# Patient Record
Sex: MALE | Race: WHITE | NOT HISPANIC OR LATINO | Employment: FULL TIME | URBAN - METROPOLITAN AREA
[De-identification: names, ages, dates, MRNs, and addresses within clinical notes are randomized per-mention and may not be internally consistent; named-entity substitution may affect disease eponyms.]

---

## 2023-02-28 LAB
ALANINE AMINOTRANSFERASE (SGPT) (U/L) IN SER/PLAS: 19 U/L (ref 10–52)
ALBUMIN (G/DL) IN SER/PLAS: 3.8 G/DL (ref 3.4–5)
ALBUMIN (MG/L) IN URINE: <7 MG/L
ALBUMIN/CREATININE (UG/MG) IN URINE: NORMAL UG/MG CRT (ref 0–30)
ALKALINE PHOSPHATASE (U/L) IN SER/PLAS: 69 U/L (ref 33–120)
ANION GAP IN SER/PLAS: 14 MMOL/L (ref 10–20)
ASPARTATE AMINOTRANSFERASE (SGOT) (U/L) IN SER/PLAS: 12 U/L (ref 9–39)
BILIRUBIN TOTAL (MG/DL) IN SER/PLAS: 0.7 MG/DL (ref 0–1.2)
CALCIDIOL (25 OH VITAMIN D3) (NG/ML) IN SER/PLAS: 14 NG/ML
CALCIUM (MG/DL) IN SER/PLAS: 8.4 MG/DL (ref 8.6–10.3)
CARBON DIOXIDE, TOTAL (MMOL/L) IN SER/PLAS: 30 MMOL/L (ref 21–32)
CHLORIDE (MMOL/L) IN SER/PLAS: 97 MMOL/L (ref 98–107)
CHOLESTEROL (MG/DL) IN SER/PLAS: 118 MG/DL (ref 0–199)
CHOLESTEROL IN HDL (MG/DL) IN SER/PLAS: 40.9 MG/DL
CHOLESTEROL/HDL RATIO: 2.9
CREATININE (MG/DL) IN SER/PLAS: 0.94 MG/DL (ref 0.5–1.3)
CREATININE (MG/DL) IN URINE: 71.4 MG/DL (ref 20–370)
ERYTHROCYTE DISTRIBUTION WIDTH (RATIO) BY AUTOMATED COUNT: 13.8 % (ref 11.5–14.5)
ERYTHROCYTE MEAN CORPUSCULAR HEMOGLOBIN CONCENTRATION (G/DL) BY AUTOMATED: 32.5 G/DL (ref 32–36)
ERYTHROCYTE MEAN CORPUSCULAR VOLUME (FL) BY AUTOMATED COUNT: 87 FL (ref 80–100)
ERYTHROCYTES (10*6/UL) IN BLOOD BY AUTOMATED COUNT: 4.91 X10E12/L (ref 4.5–5.9)
ESTIMATED AVERAGE GLUCOSE FOR HBA1C: 146 MG/DL
GFR MALE: >90 ML/MIN/1.73M2
GLUCOSE (MG/DL) IN SER/PLAS: 125 MG/DL (ref 74–99)
HEMATOCRIT (%) IN BLOOD BY AUTOMATED COUNT: 42.8 % (ref 41–52)
HEMOGLOBIN (G/DL) IN BLOOD: 13.9 G/DL (ref 13.5–17.5)
HEMOGLOBIN A1C/HEMOGLOBIN TOTAL IN BLOOD: 6.7 %
LDL: 26 MG/DL (ref 0–99)
LEUKOCYTES (10*3/UL) IN BLOOD BY AUTOMATED COUNT: 6.7 X10E9/L (ref 4.4–11.3)
NON HDL CHOLESTEROL: 77 MG/DL
PLATELETS (10*3/UL) IN BLOOD AUTOMATED COUNT: 187 X10E9/L (ref 150–450)
POTASSIUM (MMOL/L) IN SER/PLAS: 4 MMOL/L (ref 3.5–5.3)
PROTEIN TOTAL: 6.4 G/DL (ref 6.4–8.2)
SODIUM (MMOL/L) IN SER/PLAS: 137 MMOL/L (ref 136–145)
THYROTROPIN (MIU/L) IN SER/PLAS BY DETECTION LIMIT <= 0.05 MIU/L: 2.45 MIU/L (ref 0.44–3.98)
TRIGLYCERIDE (MG/DL) IN SER/PLAS: 258 MG/DL (ref 0–149)
UREA NITROGEN (MG/DL) IN SER/PLAS: 13 MG/DL (ref 6–23)
VLDL: 52 MG/DL (ref 0–40)

## 2023-09-07 LAB
ANION GAP IN SER/PLAS: 12 MMOL/L (ref 10–20)
CALCIDIOL (25 OH VITAMIN D3) (NG/ML) IN SER/PLAS: 16 NG/ML
CALCIUM (MG/DL) IN SER/PLAS: 8.8 MG/DL (ref 8.6–10.3)
CARBON DIOXIDE, TOTAL (MMOL/L) IN SER/PLAS: 26 MMOL/L (ref 21–32)
CHLORIDE (MMOL/L) IN SER/PLAS: 106 MMOL/L (ref 98–107)
CREATININE (MG/DL) IN SER/PLAS: 0.9 MG/DL (ref 0.5–1.3)
ESTIMATED AVERAGE GLUCOSE FOR HBA1C: 131 MG/DL
GFR MALE: >90 ML/MIN/1.73M2
GLUCOSE (MG/DL) IN SER/PLAS: 150 MG/DL (ref 74–99)
HEMOGLOBIN A1C/HEMOGLOBIN TOTAL IN BLOOD: 6.2 %
POTASSIUM (MMOL/L) IN SER/PLAS: 4.2 MMOL/L (ref 3.5–5.3)
SODIUM (MMOL/L) IN SER/PLAS: 140 MMOL/L (ref 136–145)
UREA NITROGEN (MG/DL) IN SER/PLAS: 15 MG/DL (ref 6–23)

## 2024-03-06 ENCOUNTER — LAB (OUTPATIENT)
Dept: LAB | Facility: LAB | Age: 54
End: 2024-03-06
Payer: COMMERCIAL

## 2024-03-06 DIAGNOSIS — E11.9 TYPE 2 DIABETES MELLITUS WITHOUT COMPLICATIONS (MULTI): Primary | ICD-10-CM

## 2024-03-06 LAB
25(OH)D3 SERPL-MCNC: 77 NG/ML (ref 30–100)
ALBUMIN SERPL BCP-MCNC: 4.2 G/DL (ref 3.4–5)
ALP SERPL-CCNC: 78 U/L (ref 33–120)
ALT SERPL W P-5'-P-CCNC: 18 U/L (ref 10–52)
ANION GAP SERPL CALC-SCNC: 15 MMOL/L (ref 10–20)
AST SERPL W P-5'-P-CCNC: 12 U/L (ref 9–39)
BILIRUB SERPL-MCNC: 0.9 MG/DL (ref 0–1.2)
BUN SERPL-MCNC: 21 MG/DL (ref 6–23)
CALCIUM SERPL-MCNC: 9.5 MG/DL (ref 8.6–10.6)
CHLORIDE SERPL-SCNC: 102 MMOL/L (ref 98–107)
CHOLEST SERPL-MCNC: 181 MG/DL (ref 0–199)
CHOLESTEROL/HDL RATIO: 4.6
CO2 SERPL-SCNC: 28 MMOL/L (ref 21–32)
CREAT SERPL-MCNC: 0.93 MG/DL (ref 0.5–1.3)
CREAT UR-MCNC: 117.4 MG/DL (ref 20–370)
EGFRCR SERPLBLD CKD-EPI 2021: >90 ML/MIN/1.73M*2
ERYTHROCYTE [DISTWIDTH] IN BLOOD BY AUTOMATED COUNT: 13.4 % (ref 11.5–14.5)
EST. AVERAGE GLUCOSE BLD GHB EST-MCNC: 120 MG/DL
GLUCOSE SERPL-MCNC: 101 MG/DL (ref 74–99)
HBA1C MFR BLD: 5.8 %
HCT VFR BLD AUTO: 44.4 % (ref 41–52)
HDLC SERPL-MCNC: 39.1 MG/DL
HGB BLD-MCNC: 14.9 G/DL (ref 13.5–17.5)
LDLC SERPL CALC-MCNC: 76 MG/DL
MCH RBC QN AUTO: 29.5 PG (ref 26–34)
MCHC RBC AUTO-ENTMCNC: 33.6 G/DL (ref 32–36)
MCV RBC AUTO: 88 FL (ref 80–100)
MICROALBUMIN UR-MCNC: <7 MG/L
MICROALBUMIN/CREAT UR: NORMAL MG/G{CREAT}
NON HDL CHOLESTEROL: 142 MG/DL (ref 0–149)
NRBC BLD-RTO: 0 /100 WBCS (ref 0–0)
PLATELET # BLD AUTO: 225 X10*3/UL (ref 150–450)
POTASSIUM SERPL-SCNC: 4.1 MMOL/L (ref 3.5–5.3)
PROT SERPL-MCNC: 6.9 G/DL (ref 6.4–8.2)
RBC # BLD AUTO: 5.05 X10*6/UL (ref 4.5–5.9)
SODIUM SERPL-SCNC: 141 MMOL/L (ref 136–145)
TRIGL SERPL-MCNC: 331 MG/DL (ref 0–149)
TSH SERPL-ACNC: 2.18 MIU/L (ref 0.44–3.98)
VLDL: 66 MG/DL (ref 0–40)
WBC # BLD AUTO: 7.2 X10*3/UL (ref 4.4–11.3)

## 2024-03-06 PROCEDURE — 84443 ASSAY THYROID STIM HORMONE: CPT

## 2024-03-06 PROCEDURE — 80061 LIPID PANEL: CPT

## 2024-03-06 PROCEDURE — 85027 COMPLETE CBC AUTOMATED: CPT

## 2024-03-06 PROCEDURE — 80053 COMPREHEN METABOLIC PANEL: CPT

## 2024-03-06 PROCEDURE — 36415 COLL VENOUS BLD VENIPUNCTURE: CPT

## 2024-03-06 PROCEDURE — 83036 HEMOGLOBIN GLYCOSYLATED A1C: CPT

## 2024-03-06 PROCEDURE — 82570 ASSAY OF URINE CREATININE: CPT

## 2024-03-06 PROCEDURE — 82306 VITAMIN D 25 HYDROXY: CPT

## 2024-03-06 PROCEDURE — 82043 UR ALBUMIN QUANTITATIVE: CPT

## 2024-03-07 ENCOUNTER — OFFICE VISIT (OUTPATIENT)
Dept: ENDOCRINOLOGY | Facility: CLINIC | Age: 54
End: 2024-03-07
Payer: COMMERCIAL

## 2024-03-07 VITALS
HEART RATE: 86 BPM | BODY MASS INDEX: 37.7 KG/M2 | SYSTOLIC BLOOD PRESSURE: 138 MMHG | WEIGHT: 278 LBS | DIASTOLIC BLOOD PRESSURE: 88 MMHG

## 2024-03-07 DIAGNOSIS — E11.9 TYPE 2 DIABETES MELLITUS WITHOUT COMPLICATION, WITHOUT LONG-TERM CURRENT USE OF INSULIN (MULTI): Primary | ICD-10-CM

## 2024-03-07 DIAGNOSIS — E78.5 HYPERLIPIDEMIA, UNSPECIFIED HYPERLIPIDEMIA TYPE: ICD-10-CM

## 2024-03-07 DIAGNOSIS — E55.9 VITAMIN D DEFICIENCY: ICD-10-CM

## 2024-03-07 DIAGNOSIS — Z98.84 BARIATRIC SURGERY STATUS: ICD-10-CM

## 2024-03-07 PROCEDURE — 3044F HG A1C LEVEL LT 7.0%: CPT | Performed by: INTERNAL MEDICINE

## 2024-03-07 PROCEDURE — 3048F LDL-C <100 MG/DL: CPT | Performed by: INTERNAL MEDICINE

## 2024-03-07 PROCEDURE — 3062F POS MACROALBUMINURIA REV: CPT | Performed by: INTERNAL MEDICINE

## 2024-03-07 PROCEDURE — 3075F SYST BP GE 130 - 139MM HG: CPT | Performed by: INTERNAL MEDICINE

## 2024-03-07 PROCEDURE — 99214 OFFICE O/P EST MOD 30 MIN: CPT | Performed by: INTERNAL MEDICINE

## 2024-03-07 PROCEDURE — 1036F TOBACCO NON-USER: CPT | Performed by: INTERNAL MEDICINE

## 2024-03-07 PROCEDURE — 3079F DIAST BP 80-89 MM HG: CPT | Performed by: INTERNAL MEDICINE

## 2024-03-07 RX ORDER — PROPRANOLOL HYDROCHLORIDE 10 MG/1
TABLET ORAL
COMMUNITY
Start: 2023-08-24

## 2024-03-07 RX ORDER — TRAZODONE HYDROCHLORIDE 50 MG/1
50 TABLET ORAL NIGHTLY
COMMUNITY

## 2024-03-07 RX ORDER — BLOOD-GLUCOSE SENSOR
EACH MISCELLANEOUS
Qty: 6 EACH | Refills: 2 | Status: SHIPPED | OUTPATIENT
Start: 2024-03-07

## 2024-03-07 RX ORDER — ATORVASTATIN CALCIUM 40 MG/1
40 TABLET, FILM COATED ORAL NIGHTLY
COMMUNITY
End: 2024-03-07 | Stop reason: SDUPTHER

## 2024-03-07 RX ORDER — ERGOCALCIFEROL 1.25 MG/1
1.25 CAPSULE ORAL
Qty: 12 CAPSULE | Refills: 3 | Status: SHIPPED | OUTPATIENT
Start: 2024-03-07 | End: 2025-03-07

## 2024-03-07 RX ORDER — BUPROPION HYDROCHLORIDE 150 MG/1
TABLET ORAL
COMMUNITY
Start: 2024-01-17

## 2024-03-07 RX ORDER — ATORVASTATIN CALCIUM 40 MG/1
40 TABLET, FILM COATED ORAL NIGHTLY
Qty: 90 TABLET | Refills: 3 | Status: SHIPPED | OUTPATIENT
Start: 2024-03-07 | End: 2025-03-07

## 2024-03-07 RX ORDER — METFORMIN HYDROCHLORIDE 1000 MG/1
1000 TABLET ORAL 2 TIMES DAILY
COMMUNITY
End: 2024-03-07 | Stop reason: SDUPTHER

## 2024-03-07 RX ORDER — BUPROPION HYDROCHLORIDE 300 MG/1
300 TABLET ORAL DAILY
COMMUNITY

## 2024-03-07 RX ORDER — CALCIUM CARBONATE/VITAMIN D3 500 MG-2.5
TABLET,CHEWABLE ORAL
COMMUNITY
Start: 2017-11-27

## 2024-03-07 RX ORDER — ESCITALOPRAM OXALATE 20 MG/1
40 TABLET ORAL DAILY
COMMUNITY

## 2024-03-07 RX ORDER — CALCIUM CARB/VITAMIN D3/VIT K1 500-500-40
TABLET,CHEWABLE ORAL
COMMUNITY
Start: 2017-11-27

## 2024-03-07 RX ORDER — BLOOD-GLUCOSE SENSOR
EACH MISCELLANEOUS
COMMUNITY
Start: 2023-12-31 | End: 2024-03-07 | Stop reason: SDUPTHER

## 2024-03-07 RX ORDER — METFORMIN HYDROCHLORIDE 1000 MG/1
1000 TABLET ORAL 2 TIMES DAILY
Qty: 180 TABLET | Refills: 3 | Status: SHIPPED | OUTPATIENT
Start: 2024-03-07 | End: 2025-03-07

## 2024-03-07 RX ORDER — MAGNESIUM 200 MG
TABLET ORAL
COMMUNITY
Start: 2017-11-27

## 2024-03-07 RX ORDER — ERGOCALCIFEROL 1.25 MG/1
1.25 CAPSULE ORAL
COMMUNITY
End: 2024-03-07 | Stop reason: SDUPTHER

## 2024-03-07 RX ORDER — SERTRALINE HYDROCHLORIDE 100 MG/1
100 TABLET, FILM COATED ORAL DAILY
COMMUNITY
Start: 2024-01-17

## 2024-03-07 RX ORDER — DEXTROAMPHETAMINE SACCHARATE, AMPHETAMINE ASPARTATE MONOHYDRATE, DEXTROAMPHETAMINE SULFATE AND AMPHETAMINE SULFATE 5; 5; 5; 5 MG/1; MG/1; MG/1; MG/1
20 CAPSULE, EXTENDED RELEASE ORAL
COMMUNITY

## 2024-03-07 ASSESSMENT — ENCOUNTER SYMPTOMS
VOMITING: 0
NAUSEA: 0
DIARRHEA: 0
UNEXPECTED WEIGHT CHANGE: 1
ENDOCRINE COMMENTS: AS ABOVE

## 2024-03-07 NOTE — PROGRESS NOTES
History Of Present Illness  Denis Mccoy is a 54 y.o. male     Duration of type 2 diabetes mellitus:  12 years  Complications:  peripheral neuropathy    Bariatric surgery status  RNYGB (11/9/17)    Weight loss >20 lbs in 6 months    Patient is testing glucose   Records not provided   FreeStyle Kemar 3 off for 1 month    Last eye exam:  2023    Vitamin D deficiency  Weekly ergocalciferol 53014 units      Hyperlipidemia    Past Medical History  He has a past medical history of Essential (primary) hypertension (09/29/2022), Hyperlipidemia, unspecified (09/29/2022), Obstructive sleep apnea (adult) (pediatric) (11/06/2019), Other acute postprocedural pain, Personal history of urinary calculi (07/16/2014), Postsurgical malabsorption, not elsewhere classified (11/07/2018), 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    Surgical History  He has a past surgical history that includes Other surgical history (07/16/2014); Gastric bypass (11/27/2017); and Adenoidectomy (05/02/2017).     Social History  He reports that he has never smoked. He has never used smokeless tobacco. No history on file for alcohol use and drug use.    Family History  No family history on file.    Medications  Current Outpatient Medications   Medication Instructions   • amphetamine-dextroamphetamine XR (Adderall XR) 20 mg 24 hr capsule 20 mg, oral, Daily before breakfast   • atorvastatin (LIPITOR) 40 mg, oral, Nightly   • buPROPion XL (Wellbutrin XL) 150 mg 24 hr tablet TAKE 1 TABLET BY MOUTH EVERY AM WITH 300MG FOR A TOTAL OF 450MG EVERY AM   • buPROPion XL (WELLBUTRIN XL) 300 mg, oral, Daily   • calcium carbonate-vitamin D3 500 mg-2.5 mcg (100 unit) tablet,chewable oral   • cyanocobalamin, vitamin B-12, 1,000 mcg tablet, sublingual sublingual   • escitalopram (LEXAPRO) 40 mg, oral, Daily   • FreeStyle Kemar 3 Sensor device use as directed EVERY 14 DAYS   • metFORMIN (GLUCOPHAGE) 1,000 mg, oral, 2 times  daily   • multivit-min-ferrous fumarate (Multi Vitamin) 9 mg iron/15 mL liquid oral   • propranolol (Inderal) 10 mg tablet TAKE 1 TO 2 TABLETS BY MOUTH ONCE A DAY AS NEEDED FOR ANXIETY.   • sertraline (ZOLOFT) 100 mg, oral, Daily   • traZODone (DESYREL) 50 mg, oral, Nightly       Allergies  Patient has no known allergies.    Review of Systems   Constitutional:  Positive for unexpected weight change (loss).   Eyes:  Positive for visual disturbance (fluctuating reading vision).   Gastrointestinal:  Negative for diarrhea, nausea and vomiting.   Endocrine:        As above         Last Recorded Vitals  Blood pressure 138/88, pulse 86, weight 126 kg (278 lb).    Physical Exam  Constitutional:       General: He is not in acute distress.  HENT:      Head: Normocephalic.      Mouth/Throat:      Mouth: Mucous membranes are moist.   Eyes:      Extraocular Movements: Extraocular movements intact.   Neck:      Thyroid: No thyromegaly.   Cardiovascular:      Pulses:           Radial pulses are 2+ on the right side and 2+ on the left side.   Lymphadenopathy:      Cervical: No cervical adenopathy.   Neurological:      Mental Status: He is alert.      Motor: No tremor.   Psychiatric:         Mood and Affect: Affect normal.        Relevant Results  Glucose (mg/dL)   Date Value   03/06/2024 101 (H)   09/07/2023 150 (H)   02/28/2023 125 (H)   09/01/2022 119 (H)     Hemoglobin A1C (%)   Date Value   03/06/2024 5.8 (H)   09/07/2023 6.2 (A)   02/28/2023 6.7 (A)   09/01/2022 7.1 (A)     Bicarbonate (mmol/L)   Date Value   03/06/2024 28   09/07/2023 26   02/28/2023 30   09/01/2022 27     Urea Nitrogen (mg/dL)   Date Value   03/06/2024 21   09/07/2023 15   02/28/2023 13   09/01/2022 13     Creatinine (mg/dL)   Date Value   03/06/2024 0.93   09/07/2023 0.90   02/28/2023 0.94   09/01/2022 1.00     Lab Results   Component Value Date    CHOL 181 03/06/2024    CHOL 118 02/28/2023    CHOL 143 09/01/2022     Lab Results   Component Value Date     HDL 39.1 03/06/2024    HDL 40.9 02/28/2023    HDL 38.8 (A) 09/01/2022     Lab Results   Component Value Date    LDLCALC 76 03/06/2024     Lab Results   Component Value Date    TRIG 331 (H) 03/06/2024    TRIG 258 (H) 02/28/2023    TRIG 263 (H) 09/01/2022     Lab Results   Component Value Date    TSH 2.18 03/06/2024       IMPRESSION  TYPE 2 DIABETES MELLITUS  Glucose well controlled  Weight loss      RECOMMENDATIONS  Continue current program    Follow up 6 months  Draw labs before next appointment

## 2024-03-07 NOTE — LETTER
March 7, 2024     Tushar Delaney DO  [address]  [facility], [address]  [facility] [address]    Patient: Denis Mccoy   YOB: 1970   Date of Visit: 3/7/2024       Dear Dr. Tushar Delaney, :    Thank you for referring Denis Mccoy to me for evaluation. Below are my notes for this consultation.  If you have questions, please do not hesitate to call me. I look forward to following your patient along with you.       Sincerely,     Baldomero Aparicio MD      CC: No Recipients  ______________________________________________________________________________________    History Of Present Illness  Denis Mccoy is a 54 y.o. male     Duration of type 2 diabetes mellitus:  12 years  Complications:  peripheral neuropathy    Bariatric surgery status  RNYGB (11/9/17)    Weight loss >20 lbs in 6 months    Patient is testing glucose   Records not provided   FreeStyle Kemar 3 off for 1 month    Last eye exam:  2023    Vitamin D deficiency  Weekly ergocalciferol 93506 units      Hyperlipidemia    Past Medical History  He has a past medical history of Essential (primary) hypertension (09/29/2022), Hyperlipidemia, unspecified (09/29/2022), Obstructive sleep apnea (adult) (pediatric) (11/06/2019), Other acute postprocedural pain, Personal history of urinary calculi (07/16/2014), Postsurgical malabsorption, not elsewhere classified (11/07/2018), 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    Surgical History  He has a past surgical history that includes Other surgical history (07/16/2014); Gastric bypass (11/27/2017); and Adenoidectomy (05/02/2017).     Social History  He reports that he has never smoked. He has never used smokeless tobacco. No history on file for alcohol use and drug use.    Family History  No family history on file.    Medications  Current Outpatient Medications   Medication Instructions   • amphetamine-dextroamphetamine XR (Adderall XR) 20  mg 24 hr capsule 20 mg, oral, Daily before breakfast   • atorvastatin (LIPITOR) 40 mg, oral, Nightly   • buPROPion XL (Wellbutrin XL) 150 mg 24 hr tablet TAKE 1 TABLET BY MOUTH EVERY AM WITH 300MG FOR A TOTAL OF 450MG EVERY AM   • buPROPion XL (WELLBUTRIN XL) 300 mg, oral, Daily   • calcium carbonate-vitamin D3 500 mg-2.5 mcg (100 unit) tablet,chewable oral   • cyanocobalamin, vitamin B-12, 1,000 mcg tablet, sublingual sublingual   • escitalopram (LEXAPRO) 40 mg, oral, Daily   • FreeStyle Kemar 3 Sensor device use as directed EVERY 14 DAYS   • metFORMIN (GLUCOPHAGE) 1,000 mg, oral, 2 times daily   • multivit-min-ferrous fumarate (Multi Vitamin) 9 mg iron/15 mL liquid oral   • propranolol (Inderal) 10 mg tablet TAKE 1 TO 2 TABLETS BY MOUTH ONCE A DAY AS NEEDED FOR ANXIETY.   • sertraline (ZOLOFT) 100 mg, oral, Daily   • traZODone (DESYREL) 50 mg, oral, Nightly       Allergies  Patient has no known allergies.    Review of Systems   Constitutional:  Positive for unexpected weight change (loss).   Eyes:  Positive for visual disturbance (fluctuating reading vision).   Gastrointestinal:  Negative for diarrhea, nausea and vomiting.   Endocrine:        As above         Last Recorded Vitals  Blood pressure 138/88, pulse 86, weight 126 kg (278 lb).    Physical Exam  Constitutional:       General: He is not in acute distress.  HENT:      Head: Normocephalic.      Mouth/Throat:      Mouth: Mucous membranes are moist.   Eyes:      Extraocular Movements: Extraocular movements intact.   Neck:      Thyroid: No thyromegaly.   Cardiovascular:      Pulses:           Radial pulses are 2+ on the right side and 2+ on the left side.   Lymphadenopathy:      Cervical: No cervical adenopathy.   Neurological:      Mental Status: He is alert.      Motor: No tremor.   Psychiatric:         Mood and Affect: Affect normal.        Relevant Results  Glucose (mg/dL)   Date Value   03/06/2024 101 (H)   09/07/2023 150 (H)   02/28/2023 125 (H)    09/01/2022 119 (H)     Hemoglobin A1C (%)   Date Value   03/06/2024 5.8 (H)   09/07/2023 6.2 (A)   02/28/2023 6.7 (A)   09/01/2022 7.1 (A)     Bicarbonate (mmol/L)   Date Value   03/06/2024 28   09/07/2023 26   02/28/2023 30   09/01/2022 27     Urea Nitrogen (mg/dL)   Date Value   03/06/2024 21   09/07/2023 15   02/28/2023 13   09/01/2022 13     Creatinine (mg/dL)   Date Value   03/06/2024 0.93   09/07/2023 0.90   02/28/2023 0.94   09/01/2022 1.00     Lab Results   Component Value Date    CHOL 181 03/06/2024    CHOL 118 02/28/2023    CHOL 143 09/01/2022     Lab Results   Component Value Date    HDL 39.1 03/06/2024    HDL 40.9 02/28/2023    HDL 38.8 (A) 09/01/2022     Lab Results   Component Value Date    LDLCALC 76 03/06/2024     Lab Results   Component Value Date    TRIG 331 (H) 03/06/2024    TRIG 258 (H) 02/28/2023    TRIG 263 (H) 09/01/2022     Lab Results   Component Value Date    TSH 2.18 03/06/2024       IMPRESSION  TYPE 2 DIABETES MELLITUS  Glucose well controlled  Weight loss      RECOMMENDATIONS  Continue current program    Follow up 6 months  Draw labs before next appointment

## 2024-03-07 NOTE — PATIENT INSTRUCTIONS
RECOMMENDATIONS  Continue current program    Follow up 6 months  Draw labs before next appointment

## 2024-09-17 ENCOUNTER — LAB (OUTPATIENT)
Dept: LAB | Facility: LAB | Age: 54
End: 2024-09-17
Payer: MEDICARE

## 2024-09-17 DIAGNOSIS — E11.9 TYPE 2 DIABETES MELLITUS WITHOUT COMPLICATION, WITHOUT LONG-TERM CURRENT USE OF INSULIN (MULTI): ICD-10-CM

## 2024-09-17 DIAGNOSIS — Z98.84 BARIATRIC SURGERY STATUS: ICD-10-CM

## 2024-09-17 DIAGNOSIS — E55.9 VITAMIN D DEFICIENCY: ICD-10-CM

## 2024-09-17 LAB
25(OH)D3 SERPL-MCNC: 47 NG/ML (ref 30–100)
ALBUMIN SERPL BCP-MCNC: 4.1 G/DL (ref 3.4–5)
ALP SERPL-CCNC: 63 U/L (ref 33–120)
ALT SERPL W P-5'-P-CCNC: 16 U/L (ref 10–52)
ANION GAP SERPL CALC-SCNC: 16 MMOL/L (ref 10–20)
AST SERPL W P-5'-P-CCNC: 12 U/L (ref 9–39)
BILIRUB SERPL-MCNC: 1 MG/DL (ref 0–1.2)
BUN SERPL-MCNC: 12 MG/DL (ref 6–23)
CALCIUM SERPL-MCNC: 8.8 MG/DL (ref 8.6–10.6)
CHLORIDE SERPL-SCNC: 104 MMOL/L (ref 98–107)
CO2 SERPL-SCNC: 25 MMOL/L (ref 21–32)
CREAT SERPL-MCNC: 0.95 MG/DL (ref 0.5–1.3)
EGFRCR SERPLBLD CKD-EPI 2021: >90 ML/MIN/1.73M*2
GLUCOSE SERPL-MCNC: 92 MG/DL (ref 74–99)
POTASSIUM SERPL-SCNC: 3.7 MMOL/L (ref 3.5–5.3)
PROT SERPL-MCNC: 6.5 G/DL (ref 6.4–8.2)
SODIUM SERPL-SCNC: 141 MMOL/L (ref 136–145)
VIT B12 SERPL-MCNC: 1221 PG/ML (ref 211–911)

## 2024-09-17 PROCEDURE — 82306 VITAMIN D 25 HYDROXY: CPT

## 2024-09-17 PROCEDURE — 36415 COLL VENOUS BLD VENIPUNCTURE: CPT

## 2024-09-17 PROCEDURE — 80053 COMPREHEN METABOLIC PANEL: CPT

## 2024-09-17 PROCEDURE — 83036 HEMOGLOBIN GLYCOSYLATED A1C: CPT

## 2024-09-17 PROCEDURE — 82607 VITAMIN B-12: CPT

## 2024-09-18 LAB
EST. AVERAGE GLUCOSE BLD GHB EST-MCNC: 120 MG/DL
HBA1C MFR BLD: 5.8 %

## 2024-09-19 ENCOUNTER — APPOINTMENT (OUTPATIENT)
Dept: ENDOCRINOLOGY | Facility: CLINIC | Age: 54
End: 2024-09-19
Payer: COMMERCIAL

## 2024-09-19 VITALS
SYSTOLIC BLOOD PRESSURE: 135 MMHG | BODY MASS INDEX: 39.17 KG/M2 | WEIGHT: 288.8 LBS | HEART RATE: 72 BPM | DIASTOLIC BLOOD PRESSURE: 83 MMHG

## 2024-09-19 DIAGNOSIS — E11.9 TYPE 2 DIABETES MELLITUS WITHOUT COMPLICATION, WITHOUT LONG-TERM CURRENT USE OF INSULIN (MULTI): Primary | ICD-10-CM

## 2024-09-19 DIAGNOSIS — E55.9 VITAMIN D DEFICIENCY: ICD-10-CM

## 2024-09-19 DIAGNOSIS — E78.5 HYPERLIPIDEMIA, UNSPECIFIED HYPERLIPIDEMIA TYPE: ICD-10-CM

## 2024-09-19 PROCEDURE — 3062F POS MACROALBUMINURIA REV: CPT | Performed by: INTERNAL MEDICINE

## 2024-09-19 PROCEDURE — 3075F SYST BP GE 130 - 139MM HG: CPT | Performed by: INTERNAL MEDICINE

## 2024-09-19 PROCEDURE — 3079F DIAST BP 80-89 MM HG: CPT | Performed by: INTERNAL MEDICINE

## 2024-09-19 PROCEDURE — 99214 OFFICE O/P EST MOD 30 MIN: CPT | Performed by: INTERNAL MEDICINE

## 2024-09-19 PROCEDURE — 3048F LDL-C <100 MG/DL: CPT | Performed by: INTERNAL MEDICINE

## 2024-09-19 PROCEDURE — 3044F HG A1C LEVEL LT 7.0%: CPT | Performed by: INTERNAL MEDICINE

## 2024-09-19 RX ORDER — METFORMIN HYDROCHLORIDE 1000 MG/1
1000 TABLET ORAL 2 TIMES DAILY
Qty: 180 TABLET | Refills: 3 | Status: SHIPPED | OUTPATIENT
Start: 2024-09-19 | End: 2025-09-19

## 2024-09-19 RX ORDER — ATORVASTATIN CALCIUM 40 MG/1
40 TABLET, FILM COATED ORAL NIGHTLY
Qty: 90 TABLET | Refills: 3 | Status: SHIPPED | OUTPATIENT
Start: 2024-09-19 | End: 2025-09-19

## 2024-09-19 RX ORDER — BLOOD-GLUCOSE SENSOR
EACH MISCELLANEOUS
Qty: 6 EACH | Refills: 2 | Status: SHIPPED | OUTPATIENT
Start: 2024-09-19

## 2024-09-19 NOTE — PROGRESS NOTES
History Of Present Illness  Denis Mccoy is a 54 y.o. male     Duration of type 2 diabetes mellitus:  12 years  Complications:  peripheral neuropathy     Bariatric surgery status  RNYGB (11/9/17)     Weight gain    Metformin 1000 mg BID      FreeStyle Kemar 3 off for 2 weeks awaiting supply  Testing glucose 1440x/day  Upload not available    Last eye exam:  2023     Vitamin D deficiency  Weekly ergocalciferol 87909 units      Hyperlipidemia  Atorvastatin 40 mg/day    Past Medical History  He has a past medical history of Essential (primary) hypertension (09/29/2022), Hyperlipidemia, unspecified (09/29/2022), Obstructive sleep apnea (adult) (pediatric) (11/06/2019), Other acute postprocedural pain, Personal history of urinary calculi (07/16/2014), Postsurgical malabsorption, not elsewhere classified ([facility]) (11/07/2018), 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    Surgical History  He has a past surgical history that includes Other surgical history (07/16/2014); Gastric bypass (11/27/2017); and Adenoidectomy (05/02/2017).     Social History  He reports that he has never smoked. He has never used smokeless tobacco. No history on file for alcohol use and drug use.    Family History  No family history on file.    Medications  Current Outpatient Medications   Medication Instructions    amphetamine-dextroamphetamine XR (Adderall XR) 20 mg 24 hr capsule 20 mg, oral, Daily before breakfast    atorvastatin (LIPITOR) 40 mg, oral, Nightly    blood-glucose sensor (FreeStyle Kemar 3 Sensor) device For continuous glucose monitoring.  Change every 14 days.    buPROPion XL (Wellbutrin XL) 150 mg 24 hr tablet TAKE 1 TABLET BY MOUTH EVERY AM WITH 300MG FOR A TOTAL OF 450MG EVERY AM    buPROPion XL (WELLBUTRIN XL) 300 mg, oral, Daily    calcium carbonate-vitamin D3 500 mg-2.5 mcg (100 unit) tablet,chewable oral    cyanocobalamin, vitamin B-12, 1,000 mcg tablet, sublingual sublingual     ergocalciferol (VITAMIN D-2) 1,250 mcg, oral, Once Weekly    metFORMIN (GLUCOPHAGE) 1,000 mg, oral, 2 times daily    multivit-min-ferrous fumarate (Multi Vitamin) 9 mg iron/15 mL liquid oral    propranolol (Inderal) 10 mg tablet TAKE 1 TO 2 TABLETS BY MOUTH ONCE A DAY AS NEEDED FOR ANXIETY.    sertraline (ZOLOFT) 100 mg, oral, Daily    traZODone (DESYREL) 50 mg, oral, Nightly       Allergies  Patient has no known allergies.    Review of Systems   Constitutional:  Negative for appetite change and fever.   HENT:  Negative for sore throat.         Denies dry mouth   Eyes:  Negative for visual disturbance.   Respiratory:  Negative for cough and shortness of breath.    Cardiovascular:  Negative for chest pain.   Gastrointestinal:  Negative for abdominal pain, constipation, diarrhea, nausea and vomiting.   Endocrine: Negative for polydipsia and polyuria.   Genitourinary:  Negative for frequency.   Musculoskeletal:  Negative for arthralgias.   Skin:  Negative for rash.   Neurological:  Positive for tremors. Negative for headaches.   Psychiatric/Behavioral:  The patient is nervous/anxious.          Last Recorded Vitals  Blood pressure 135/83, pulse 72, weight 131 kg (288 lb 12.8 oz).    Physical Exam  Constitutional:       General: He is not in acute distress.  HENT:      Head: Normocephalic.      Mouth/Throat:      Mouth: Mucous membranes are moist.   Eyes:      Extraocular Movements: Extraocular movements intact.   Neck:      Thyroid: No thyroid mass or thyromegaly.   Cardiovascular:      Pulses:           Radial pulses are 2+ on the right side and 2+ on the left side.   Musculoskeletal:      Right lower leg: No edema.      Left lower leg: No edema.   Lymphadenopathy:      Cervical: No cervical adenopathy.   Neurological:      Mental Status: He is alert.      Motor: No tremor.   Psychiatric:         Mood and Affect: Affect normal.          Relevant Results  Glucose (mg/dL)   Date Value   09/17/2024 92   03/06/2024 101  (H)   09/07/2023 150 (H)   02/28/2023 125 (H)   09/01/2022 119 (H)     Hemoglobin A1C (%)   Date Value   09/17/2024 5.8 (H)   03/06/2024 5.8 (H)   09/07/2023 6.2 (A)   02/28/2023 6.7 (A)   09/01/2022 7.1 (A)     Bicarbonate (mmol/L)   Date Value   09/17/2024 25   03/06/2024 28   09/07/2023 26   02/28/2023 30   09/01/2022 27     Urea Nitrogen (mg/dL)   Date Value   09/17/2024 12   03/06/2024 21   09/07/2023 15   02/28/2023 13   09/01/2022 13     Creatinine (mg/dL)   Date Value   09/17/2024 0.95   03/06/2024 0.93   09/07/2023 0.90   02/28/2023 0.94   09/01/2022 1.00     Lab Results   Component Value Date    CHOL 181 03/06/2024    CHOL 118 02/28/2023    CHOL 143 09/01/2022     Lab Results   Component Value Date    HDL 39.1 03/06/2024    HDL 40.9 02/28/2023    HDL 38.8 (A) 09/01/2022     Lab Results   Component Value Date    LDLCALC 76 03/06/2024     Lab Results   Component Value Date    TRIG 331 (H) 03/06/2024    TRIG 258 (H) 02/28/2023    TRIG 263 (H) 09/01/2022     Lab Results   Component Value Date    TSH 2.18 03/06/2024       IMPRESSION  TYPE 2 DIABETES MELLITUS    HYPERLIPIDEMIA    VITAMIN D DEFICIENCY      RECOMMENDATIONS  Continue current medications    Follow up 6 months  Draw labs before next appointment    Review Kemar at all appointments

## 2024-09-19 NOTE — PATIENT INSTRUCTIONS
RECOMMENDATIONS  Continue current medications    Follow up 6 months  Draw labs before next appointment    Review Kemar at all appointments

## 2024-09-24 ENCOUNTER — HOSPITAL ENCOUNTER (OUTPATIENT)
Dept: RADIOLOGY | Facility: CLINIC | Age: 54
Discharge: HOME | End: 2024-09-24
Payer: MEDICARE

## 2024-09-24 ENCOUNTER — APPOINTMENT (OUTPATIENT)
Dept: PRIMARY CARE | Facility: CLINIC | Age: 54
End: 2024-09-24
Payer: MEDICARE

## 2024-09-24 ENCOUNTER — LAB (OUTPATIENT)
Dept: LAB | Facility: LAB | Age: 54
End: 2024-09-24
Payer: MEDICARE

## 2024-09-24 VITALS
HEART RATE: 69 BPM | HEIGHT: 72 IN | SYSTOLIC BLOOD PRESSURE: 122 MMHG | DIASTOLIC BLOOD PRESSURE: 72 MMHG | BODY MASS INDEX: 39.96 KG/M2 | WEIGHT: 295 LBS

## 2024-09-24 DIAGNOSIS — M25.551 PAIN OF RIGHT HIP: ICD-10-CM

## 2024-09-24 DIAGNOSIS — Z12.5 PROSTATE CANCER SCREENING: ICD-10-CM

## 2024-09-24 DIAGNOSIS — Z00.00 ANNUAL PHYSICAL EXAM: ICD-10-CM

## 2024-09-24 DIAGNOSIS — I10 BENIGN ESSENTIAL HYPERTENSION: ICD-10-CM

## 2024-09-24 DIAGNOSIS — Z12.11 COLON CANCER SCREENING: ICD-10-CM

## 2024-09-24 DIAGNOSIS — Z00.00 ANNUAL PHYSICAL EXAM: Primary | ICD-10-CM

## 2024-09-24 DIAGNOSIS — E11.9 TYPE 2 DIABETES MELLITUS WITHOUT COMPLICATION, WITHOUT LONG-TERM CURRENT USE OF INSULIN (MULTI): ICD-10-CM

## 2024-09-24 DIAGNOSIS — E78.2 MIXED HYPERLIPIDEMIA: ICD-10-CM

## 2024-09-24 PROBLEM — M75.111 INCOMPLETE TEAR OF RIGHT ROTATOR CUFF: Status: ACTIVE | Noted: 2024-09-24

## 2024-09-24 PROBLEM — K59.09 CHRONIC CONSTIPATION: Status: ACTIVE | Noted: 2024-09-24

## 2024-09-24 PROBLEM — G47.33 OBSTRUCTIVE SLEEP APNEA SYNDROME: Status: ACTIVE | Noted: 2024-09-24

## 2024-09-24 PROBLEM — M25.511 PAIN IN RIGHT SHOULDER: Status: ACTIVE | Noted: 2024-09-24

## 2024-09-24 PROBLEM — E78.5 HYPERLIPIDEMIA: Status: ACTIVE | Noted: 2024-09-24

## 2024-09-24 PROBLEM — E55.9 VITAMIN D DEFICIENCY: Status: ACTIVE | Noted: 2024-09-24

## 2024-09-24 PROBLEM — E29.1 HYPOGONADISM MALE: Status: ACTIVE | Noted: 2024-09-24

## 2024-09-24 PROBLEM — M75.41 IMPINGEMENT SYNDROME OF RIGHT SHOULDER: Status: ACTIVE | Noted: 2024-09-24

## 2024-09-24 PROBLEM — F32.A DEPRESSION: Status: ACTIVE | Noted: 2024-09-24

## 2024-09-24 PROBLEM — E78.3: Status: ACTIVE | Noted: 2024-09-24

## 2024-09-24 LAB
POC APPEARANCE, URINE: CLEAR
POC BILIRUBIN, URINE: NEGATIVE
POC BLOOD, URINE: NEGATIVE
POC COLOR, URINE: YELLOW
POC GLUCOSE, URINE: NEGATIVE MG/DL
POC KETONES, URINE: NEGATIVE MG/DL
POC LEUKOCYTES, URINE: NEGATIVE
POC NITRITE,URINE: NEGATIVE
POC PH, URINE: 6 PH
POC PROTEIN, URINE: NEGATIVE MG/DL
POC SPECIFIC GRAVITY, URINE: 1.02
POC UROBILINOGEN, URINE: 0.2 EU/DL
PSA SERPL-MCNC: 0.4 NG/ML

## 2024-09-24 PROCEDURE — 99396 PREV VISIT EST AGE 40-64: CPT | Performed by: FAMILY MEDICINE

## 2024-09-24 PROCEDURE — 3008F BODY MASS INDEX DOCD: CPT | Performed by: FAMILY MEDICINE

## 2024-09-24 PROCEDURE — 84153 ASSAY OF PSA TOTAL: CPT

## 2024-09-24 PROCEDURE — 93000 ELECTROCARDIOGRAM COMPLETE: CPT | Performed by: FAMILY MEDICINE

## 2024-09-24 PROCEDURE — 1036F TOBACCO NON-USER: CPT | Performed by: FAMILY MEDICINE

## 2024-09-24 PROCEDURE — 3044F HG A1C LEVEL LT 7.0%: CPT | Performed by: FAMILY MEDICINE

## 2024-09-24 PROCEDURE — 3074F SYST BP LT 130 MM HG: CPT | Performed by: FAMILY MEDICINE

## 2024-09-24 PROCEDURE — 73502 X-RAY EXAM HIP UNI 2-3 VIEWS: CPT | Mod: RIGHT SIDE | Performed by: RADIOLOGY

## 2024-09-24 PROCEDURE — 99213 OFFICE O/P EST LOW 20 MIN: CPT | Performed by: FAMILY MEDICINE

## 2024-09-24 PROCEDURE — 73502 X-RAY EXAM HIP UNI 2-3 VIEWS: CPT | Mod: RT

## 2024-09-24 PROCEDURE — 3078F DIAST BP <80 MM HG: CPT | Performed by: FAMILY MEDICINE

## 2024-09-24 PROCEDURE — 81002 URINALYSIS NONAUTO W/O SCOPE: CPT | Performed by: FAMILY MEDICINE

## 2024-09-24 PROCEDURE — 3048F LDL-C <100 MG/DL: CPT | Performed by: FAMILY MEDICINE

## 2024-09-24 PROCEDURE — 3062F POS MACROALBUMINURIA REV: CPT | Performed by: FAMILY MEDICINE

## 2024-09-24 PROCEDURE — 36415 COLL VENOUS BLD VENIPUNCTURE: CPT

## 2024-09-24 PROCEDURE — 20610 DRAIN/INJ JOINT/BURSA W/O US: CPT | Performed by: FAMILY MEDICINE

## 2024-09-24 RX ORDER — TRIAMCINOLONE ACETONIDE 40 MG/ML
40 INJECTION, SUSPENSION INTRA-ARTICULAR; INTRAMUSCULAR ONCE
Status: COMPLETED | OUTPATIENT
Start: 2024-09-24 | End: 2024-09-24

## 2024-09-24 NOTE — ASSESSMENT & PLAN NOTE
We provided you with a cortisone injection into the bursa sac today on the right side hoping to alleviate some pain both acutely and long-term  I would also like to have you obtain an x-ray of your right hip at your earliest convenience so that we can compare it to 2016  I offered orthopedic consult with Dr. Potter, but at this time you declined  If you change your mind, please let us know

## 2024-09-24 NOTE — PROGRESS NOTES
Subjective   Patient ID: Denis Mccoy is a 54 y.o. male who presents for Annual Exam and Hip Pain.    Past Medical, Surgical, and Family History reviewed and updated in chart.    Reviewed all medications by prescribing practitioner or clinical pharmacist (such as prescriptions, OTCs, herbal therapies and supplements) and documented in the medical record.    HPI  1.  Physical exam.  Colonoscopy: never done, but as he has told this in the past, he is willing to do it  Denis endorses a poor diet, does not exercise, does not smoke or drink alcohol. He has hyperlipidemia, diabetes, and depression.     Last A1C was 5.8 on 09/17/2024, Triglycerides 331 on 03/06/2024. Dr. Aparicio manages both of these conditions, Denis takes metformin 1000 mg bid, and Lipitor 40 mg once daily at bed time. He is tolerating both of these medications well, without adverse side effects.     Furthermore, he takes Wellbutrin, Zoloft, and trazodone, and adderall all of which are prescribed by his psychiatrist. He is tolerating these medications well, without adverse side effects.     2. Arthritis of R hip.  Denis has a history of arthritis of his R hip. XR hip w pelvis 01/07/2016 showed mild degenerative changes of the hip along with what was felt to be acetabular impingement syndrome. He states that he is sitting for 16 hours daily and does not exercise. He does not have any radiation of pain or numbness/tingling. He states that he wanted to receive a steroid injection to make him feel better.     Review of Systems  Denies fever/chills, nausea/vomiting, weight loss/gain, sleep disturbances, mood changes, psychiatric disturbances, fatigue, dizziness, weakness, confusion, headache, vision/hearing changes, numbness/tingling, cough, shortness of breath, chest pain, palpitations, abdominal pain, postprandial pain, bowel/bladder changes, skin/hair/nail changes, myalgias, rash, calf pain, claudication, lower extremity edema.     All pertinent  positive symptoms are included in the history of present illness.    All other systems have been reviewed and are negative and noncontributory to this patient's current ailments.    Past Medical History:   Diagnosis Date    Essential (primary) hypertension 09/29/2022    Benign essential hypertension    Hyperlipidemia, unspecified 09/29/2022    Hyperlipidemia    Obstructive sleep apnea (adult) (pediatric) 11/06/2019    BRANDON on CPAP    Other acute postprocedural pain     Post-operative pain    Personal history of urinary calculi 07/16/2014    Personal history of renal calculi    Postsurgical malabsorption, not elsewhere classified ([facility]) 11/07/2018    Malnutrition following gastrointestinal surgery    Testicular hypofunction 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  Immunization History   Administered Date(s) Administered    Flu vaccine (IIV4), preservative free *Check age/dose* 09/28/2016, 10/14/2019, 09/24/2022    Influenza, seasonal, injectable 10/03/2013    Pfizer COVID-19 vaccine, bivalent, age 12 years and older (30 mcg/0.3 mL) 09/24/2022    Pfizer Purple Cap SARS-CoV-2 03/18/2021, 04/12/2021, 10/16/2021    Pneumococcal polysaccharide vaccine, 23-valent, age 2 years and older (PNEUMOVAX 23) 04/10/2017    Tdap vaccine, age 7 year and older (BOOSTRIX, ADACEL) 04/10/2017     Current Outpatient Medications   Medication Instructions    amphetamine-dextroamphetamine XR (Adderall XR) 20 mg 24 hr capsule 20 mg, oral, Daily before breakfast    atorvastatin  (LIPITOR) 40 mg, oral, Nightly    buPROPion XL (Wellbutrin XL) 150 mg 24 hr tablet TAKE 1 TABLET BY MOUTH EVERY AM WITH 300MG FOR A TOTAL OF 450MG EVERY AM    buPROPion XL (WELLBUTRIN XL) 300 mg, oral, Daily    calcium carbonate-vitamin D3 500 mg-2.5 mcg (100 unit) tablet,chewable oral    cyanocobalamin, vitamin B-12, 1,000 mcg tablet, sublingual sublingual    ergocalciferol (VITAMIN D-2) 1,250 mcg, oral, Once Weekly    FreeStyle Kemar 3 Sensor device For continuous glucose monitoring.  Change every 14 days.    metFORMIN (GLUCOPHAGE) 1,000 mg, oral, 2 times daily    multivit-min-ferrous fumarate (Multi Vitamin) 9 mg iron/15 mL liquid oral    propranolol (Inderal) 10 mg tablet TAKE 1 TO 2 TABLETS BY MOUTH ONCE A DAY AS NEEDED FOR ANXIETY.    sertraline (ZOLOFT) 100 mg, oral, Daily    traZODone (DESYREL) 50 mg, oral, Nightly     No Known Allergies    Objective   Vitals:    09/24/24 1300   BP: 122/72   Pulse: 69   Weight: 134 kg (295 lb)   Height: 1.829 m (6')     Body mass index is 40.01 kg/m².    BP Readings from Last 3 Encounters:   09/24/24 122/72   09/19/24 135/83   03/07/24 138/88      Wt Readings from Last 3 Encounters:   09/24/24 134 kg (295 lb)   09/19/24 131 kg (288 lb 12.8 oz)   03/07/24 126 kg (278 lb)      Office Visit on 09/24/2024   Component Date Value    POC Color, Urine 09/24/2024 Yellow     POC Appearance, Urine 09/24/2024 Clear     POC Glucose, Urine 09/24/2024 NEGATIVE     POC Bilirubin, Urine 09/24/2024 NEGATIVE     POC Ketones, Urine 09/24/2024 NEGATIVE     POC Specific Gravity, Ur* 09/24/2024 1.020     POC Blood, Urine 09/24/2024 NEGATIVE     POC PH, Urine 09/24/2024 6.0     POC Protein, Urine 09/24/2024 NEGATIVE     POC Urobilinogen, Urine 09/24/2024 0.2     Poc Nitrite, Urine 09/24/2024 NEGATIVE     POC Leukocytes, Urine 09/24/2024 NEGATIVE    Lab on 09/17/2024   Component Date Value    Vitamin D, 25-Hydroxy, T* 09/17/2024 47     Glucose 09/17/2024 92     Sodium 09/17/2024 141     Potassium  09/17/2024 3.7     Chloride 09/17/2024 104     Bicarbonate 09/17/2024 25     Anion Gap 09/17/2024 16     Urea Nitrogen 09/17/2024 12     Creatinine 09/17/2024 0.95     eGFR 09/17/2024 >90     Calcium 09/17/2024 8.8     Albumin 09/17/2024 4.1     Alkaline Phosphatase 09/17/2024 63     Total Protein 09/17/2024 6.5     AST 09/17/2024 12     Bilirubin, Total 09/17/2024 1.0     ALT 09/17/2024 16     Hemoglobin A1C 09/17/2024 5.8 (H)     Estimated Average Glucose 09/17/2024 120     Vitamin B12 09/17/2024 1,221 (H)      Physical Exam  CONSTITUTIONAL - well nourished, well developed, looks like stated age, in no acute distress, not ill-appearing, and not tired appearing  SKIN - normal skin color and pigmentation, normal skin turgor without rash, lesions, or nodules visualized  HEAD - no trauma, normocephalic  EYES - pupils are equal and reactive to light, extraocular muscles are intact, and normal external exam  ENT - TM's intact, no injection, no signs of infection, uvula midline, normal tongue movement and throat normal, no exudate, nasal passage without discharge and patent  NECK - supple without rigidity, no neck mass was observed, no thyromegaly or thyroid nodules  CHEST - clear to auscultation, no wheezing, no crackles and no rales, good effort  CARDIAC - regular rate and regular rhythm, no skipped beats, no murmur  ABDOMEN - no organomegaly, soft, nontender, nondistended, normal bowel sounds, no guarding/rebound/rigidity  EXTREMITIES - no obvious or evident edema, no obvious or evident deformities; unable to reproduce any tenderness at the greater trochanteric bursa on the right side, no ecchymosis, erythema, warmth in that area of concern  NEUROLOGICAL - normal gait, normal balance, normal motor, no ataxia, DTRs equal and symmetrical; alert, oriented and no focal signs  PSYCHIATRIC - alert, pleasant and cordial, age-appropriate  IMMUNOLOGIC - no cervical lymphadenopathy    Joint Injection Large/Arthrocentesis: SACHA  greater trochanteric bursa on 9/24/2024 3:36 PM  Indications: pain  Details: 22 G needle, posterior approach    1% lidocaine (2 mL) and Kenalog 40 mg injected into the joint as noted on the examination. It was successful, without complication, and tolerated extremely well.    Please return to the clinic if pain, swelling, or signs of infection occur. It will be necessary to further evaluate you at that time, along with considering the possibility of an orthopedic consultation to provide further medical management  Procedure, treatment alternatives, risks and benefits explained, specific risks discussed. Consent was given by the patient. Patient was prepped and draped in the usual sterile fashion.       Assessment/Plan   Problem List Items Addressed This Visit       Hyperlipidemia     Please continue to follow Dr. Aparicio per protocol  CT cardiac score recommended, please have completed at your earliest convenience         Diabetes mellitus (Multi)     Continue to follow with endocrinology per protocol         Benign essential hypertension     Stable, continue to follow with endocrinology per protocol         Annual physical exam - Primary     Complete history and physical examination was performed  EKG reveals sinus rhythm without acute changes  We will notify of test results once available and make treatment recommendations accordingly          Relevant Orders    Prostate Specific Antigen    POCT UA (nonautomated) manually resulted (Completed)    ECG 12 Lead (Completed)    Pain of right hip     We provided you with a cortisone injection into the bursa sac today on the right side hoping to alleviate some pain both acutely and long-term  I would also like to have you obtain an x-ray of your right hip at your earliest convenience so that we can compare it to 2016  I offered orthopedic consult with Dr. Potter, but at this time you declined  If you change your mind, please let us know         Relevant Medications     triamcinolone acetonide (Kenalog-40) injection 40 mg (Completed)    Other Relevant Orders    XR hip right with pelvis when performed 2 or 3 views    CT cardiac scoring wo IV contrast    Joint Injection Large/Arthrocentesis: R greater trochanteric bursa     Other Visit Diagnoses       Prostate cancer screening        Relevant Orders    Prostate Specific Antigen    Colon cancer screening        Relevant Orders    Colonoscopy Screening; Average Risk Patient

## 2024-09-24 NOTE — ASSESSMENT & PLAN NOTE
Please continue to follow Dr. Aparicio per protocol  CT cardiac score recommended, please have completed at your earliest convenience

## 2024-09-25 ASSESSMENT — ENCOUNTER SYMPTOMS
APPETITE CHANGE: 0
NERVOUS/ANXIOUS: 1
COUGH: 0
ABDOMINAL PAIN: 0
NAUSEA: 0
VOMITING: 0
POLYDIPSIA: 0
DIARRHEA: 0
TREMORS: 1
FEVER: 0
ARTHRALGIAS: 0
CONSTIPATION: 0
SHORTNESS OF BREATH: 0
SORE THROAT: 0
HEADACHES: 0
FREQUENCY: 0

## 2024-09-26 NOTE — RESULT ENCOUNTER NOTE
X-ray of the hip shows moderate to severe arthritic changes, so I do not think the shot that we provided you was going to be significantly improving your symptoms.  If you are interested in pursuing a surgical consult, just let me know so I can make that happen for you

## 2024-11-13 DIAGNOSIS — E11.9 TYPE 2 DIABETES MELLITUS WITHOUT COMPLICATION, WITHOUT LONG-TERM CURRENT USE OF INSULIN (MULTI): Primary | ICD-10-CM

## 2024-11-13 RX ORDER — HYDROCHLOROTHIAZIDE 12.5 MG/1
CAPSULE ORAL
Qty: 6 EACH | Refills: 3 | Status: SHIPPED | OUTPATIENT
Start: 2024-11-13

## 2025-03-05 LAB
25(OH)D3+25(OH)D2 SERPL-MCNC: 55 NG/ML (ref 30–100)
ALBUMIN SERPL-MCNC: 4.5 G/DL (ref 3.6–5.1)
ALP SERPL-CCNC: 75 U/L (ref 35–144)
ALT SERPL-CCNC: 18 U/L (ref 9–46)
ANION GAP SERPL CALCULATED.4IONS-SCNC: 12 MMOL/L (CALC) (ref 7–17)
AST SERPL-CCNC: 14 U/L (ref 10–35)
BILIRUB SERPL-MCNC: 1.2 MG/DL (ref 0.2–1.2)
BUN SERPL-MCNC: 10 MG/DL (ref 7–25)
CALCIUM SERPL-MCNC: 9.2 MG/DL (ref 8.6–10.3)
CHLORIDE SERPL-SCNC: 104 MMOL/L (ref 98–110)
CHOLEST SERPL-MCNC: 272 MG/DL
CHOLEST/HDLC SERPL: 6.3 (CALC)
CO2 SERPL-SCNC: 26 MMOL/L (ref 20–32)
CREAT SERPL-MCNC: 0.83 MG/DL (ref 0.7–1.3)
EGFRCR SERPLBLD CKD-EPI 2021: 104 ML/MIN/1.73M2
ERYTHROCYTE [DISTWIDTH] IN BLOOD BY AUTOMATED COUNT: 13.4 % (ref 11–15)
EST. AVERAGE GLUCOSE BLD GHB EST-MCNC: 117 MG/DL
EST. AVERAGE GLUCOSE BLD GHB EST-SCNC: 6.5 MMOL/L
GLUCOSE SERPL-MCNC: 92 MG/DL (ref 65–99)
HBA1C MFR BLD: 5.7 % OF TOTAL HGB
HCT VFR BLD AUTO: 46.4 % (ref 38.5–50)
HDLC SERPL-MCNC: 43 MG/DL
HGB BLD-MCNC: 15.5 G/DL (ref 13.2–17.1)
LDLC SERPL CALC-MCNC: ABNORMAL MG/DL
MCH RBC QN AUTO: 30.5 PG (ref 27–33)
MCHC RBC AUTO-ENTMCNC: 33.4 G/DL (ref 32–36)
MCV RBC AUTO: 91.3 FL (ref 80–100)
NONHDLC SERPL-MCNC: 229 MG/DL (CALC)
PLATELET # BLD AUTO: 227 THOUSAND/UL (ref 140–400)
PMV BLD REES-ECKER: 10.1 FL (ref 7.5–12.5)
POTASSIUM SERPL-SCNC: 3.6 MMOL/L (ref 3.5–5.3)
PROT SERPL-MCNC: 7.2 G/DL (ref 6.1–8.1)
RBC # BLD AUTO: 5.08 MILLION/UL (ref 4.2–5.8)
SODIUM SERPL-SCNC: 142 MMOL/L (ref 135–146)
TRIGL SERPL-MCNC: 438 MG/DL
TSH SERPL-ACNC: 1.61 MIU/L (ref 0.4–4.5)
WBC # BLD AUTO: 7.5 THOUSAND/UL (ref 3.8–10.8)

## 2025-03-06 ENCOUNTER — APPOINTMENT (OUTPATIENT)
Dept: ENDOCRINOLOGY | Facility: CLINIC | Age: 55
End: 2025-03-06
Payer: MEDICARE

## 2025-03-06 VITALS
WEIGHT: 273.37 LBS | BODY MASS INDEX: 37.08 KG/M2 | DIASTOLIC BLOOD PRESSURE: 79 MMHG | HEART RATE: 72 BPM | SYSTOLIC BLOOD PRESSURE: 118 MMHG

## 2025-03-06 DIAGNOSIS — E78.5 HYPERLIPIDEMIA, UNSPECIFIED HYPERLIPIDEMIA TYPE: ICD-10-CM

## 2025-03-06 DIAGNOSIS — E11.9 TYPE 2 DIABETES MELLITUS WITHOUT COMPLICATION, WITHOUT LONG-TERM CURRENT USE OF INSULIN: Primary | ICD-10-CM

## 2025-03-06 PROCEDURE — 3074F SYST BP LT 130 MM HG: CPT | Performed by: INTERNAL MEDICINE

## 2025-03-06 PROCEDURE — 99213 OFFICE O/P EST LOW 20 MIN: CPT | Performed by: INTERNAL MEDICINE

## 2025-03-06 PROCEDURE — 3078F DIAST BP <80 MM HG: CPT | Performed by: INTERNAL MEDICINE

## 2025-03-06 RX ORDER — LANCETS 33 GAUGE
EACH MISCELLANEOUS
Qty: 100 EACH | Refills: 3 | Status: SHIPPED | OUTPATIENT
Start: 2025-03-06

## 2025-03-06 RX ORDER — BLOOD-GLUCOSE METER
EACH MISCELLANEOUS
Qty: 100 STRIP | Refills: 3 | Status: SHIPPED | OUTPATIENT
Start: 2025-03-06

## 2025-03-06 RX ORDER — FERROUS SULFATE 325(65) MG
325 TABLET ORAL
COMMUNITY

## 2025-03-06 RX ORDER — LANCETS 30 GAUGE
EACH MISCELLANEOUS
Qty: 1 EACH | Refills: 0 | Status: SHIPPED | OUTPATIENT
Start: 2025-03-06

## 2025-03-06 RX ORDER — ATORVASTATIN CALCIUM 40 MG/1
40 TABLET, FILM COATED ORAL NIGHTLY
Qty: 90 TABLET | Refills: 3 | Status: SHIPPED | OUTPATIENT
Start: 2025-03-06 | End: 2026-03-06

## 2025-03-06 RX ORDER — ASCORBIC ACID 125 MG
TABLET,CHEWABLE ORAL
COMMUNITY

## 2025-03-06 RX ORDER — LISDEXAMFETAMINE DIMESYLATE 20 MG/1
CAPSULE ORAL
COMMUNITY
Start: 2025-03-04

## 2025-03-06 ASSESSMENT — ENCOUNTER SYMPTOMS
NERVOUS/ANXIOUS: 1
CONSTIPATION: 0
VOMITING: 0
ABDOMINAL PAIN: 0
ARTHRALGIAS: 0
FEVER: 0
SHORTNESS OF BREATH: 0
POLYDIPSIA: 0
NAUSEA: 0
FREQUENCY: 0
SORE THROAT: 0
APPETITE CHANGE: 0
HEADACHES: 0
DIARRHEA: 0
COUGH: 0
DYSPHORIC MOOD: 1
UNEXPECTED WEIGHT CHANGE: 0

## 2025-03-06 NOTE — PROGRESS NOTES
History Of Present Illness  Denis Mccoy is a 55 y.o. male     Duration of type 2 diabetes mellitus:  12 years  Complications:  peripheral neuropathy     Bariatric surgery status  RNYGB (11/9/17)     Weight loss     Metformin 1000 mg BID        FreeStyle Kemar 3 off.  Kemar 3 Plus sensors are too expensive  Testing glucose:  none     Last eye exam:  2023    Past Medical History  He has a past medical history of Essential (primary) hypertension (09/29/2022), Hyperlipidemia, unspecified (09/29/2022), Obstructive sleep apnea (adult) (pediatric) (11/06/2019), Other acute postprocedural pain, Personal history of urinary calculi (07/16/2014), Postsurgical malabsorption, not elsewhere classified (11/07/2018), 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    Surgical History  He has a past surgical history that includes Other surgical history (07/16/2014); Gastric bypass (11/27/2017); and Adenoidectomy (05/02/2017).     Social History  He reports that he has never smoked. He has never used smokeless tobacco. No history on file for alcohol use and drug use.    Family History  No family history on file.    Medications  Current Outpatient Medications   Medication Instructions    amphetamine-dextroamphetamine XR (Adderall XR) 20 mg 24 hr capsule 20 mg, Daily before breakfast    atorvastatin (LIPITOR) 40 mg, oral, Nightly    B complex-vitamin C-folic acid (Nephro-Jj Rx) 1- mg-mg-mcg tablet 1 tablet, Daily with breakfast    buPROPion XL (Wellbutrin XL) 150 mg 24 hr tablet TAKE 1 TABLET BY MOUTH EVERY AM WITH 300MG FOR A TOTAL OF 450MG EVERY AM    buPROPion XL (WELLBUTRIN XL) 300 mg, Daily    calcium carbonate-vitamin D3 500 mg-2.5 mcg (100 unit) tablet,chewable Chew.    cyanocobalamin, vitamin B-12, 1,000 mcg tablet, sublingual Place under the tongue.    ergocalciferol (VITAMIN D-2) 1,250 mcg, oral, Once Weekly    ferrous sulfate 325 mg, oral, Daily with breakfast    FreeStyle  Kemar 3 Plus Sensor device For continuous glucose monitoring.  Change every 15 days.    lisdexamfetamine (Vyvanse) 20 mg capsule     melatonin 5 mg tablet,chewable Chew.    metFORMIN (GLUCOPHAGE) 1,000 mg, oral, 2 times daily    multivit-min-ferrous fumarate (Multi Vitamin) 9 mg iron/15 mL liquid Take by mouth.    propranolol (Inderal) 10 mg tablet TAKE 1 TO 2 TABLETS BY MOUTH ONCE A DAY AS NEEDED FOR ANXIETY.    sertraline (ZOLOFT) 100 mg, Daily    traZODone (DESYREL) 50 mg, Nightly       Allergies  Patient has no known allergies.    Review of Systems   Constitutional:  Negative for appetite change, fever and unexpected weight change (loss).   HENT:  Negative for sore throat.         Denies dry mouth   Eyes:  Negative for visual disturbance.   Respiratory:  Negative for cough and shortness of breath.    Cardiovascular:  Negative for chest pain.   Gastrointestinal:  Negative for abdominal pain, constipation, diarrhea, nausea and vomiting.   Endocrine: Negative for polydipsia and polyuria.   Genitourinary:  Negative for frequency.   Musculoskeletal:  Negative for arthralgias.   Skin:  Negative for rash.   Neurological:  Negative for headaches.   Psychiatric/Behavioral:  Positive for dysphoric mood. The patient is nervous/anxious.         Memory loss         Last Recorded Vitals  Blood pressure 118/79, pulse 72, weight 124 kg (273 lb 5.9 oz).    Physical Exam  Constitutional:       General: He is not in acute distress.  HENT:      Head: Normocephalic.   Neurological:      Mental Status: He is alert.   Psychiatric:         Mood and Affect: Affect normal.          Relevant Results  GLUCOSE (mg/dL)   Date Value   03/04/2025 92     Glucose (mg/dL)   Date Value   09/17/2024 92   03/06/2024 101 (H)   09/07/2023 150 (H)   02/28/2023 125 (H)   09/01/2022 119 (H)     HEMOGLOBIN A1c (% of total Hgb)   Date Value   03/04/2025 5.7 (H)     Hemoglobin A1C (%)   Date Value   09/17/2024 5.8 (H)   03/06/2024 5.8 (H)   09/07/2023 6.2  (A)   02/28/2023 6.7 (A)   09/01/2022 7.1 (A)     CARBON DIOXIDE (mmol/L)   Date Value   03/04/2025 26     Bicarbonate (mmol/L)   Date Value   09/17/2024 25   03/06/2024 28   09/07/2023 26   02/28/2023 30   09/01/2022 27     UREA NITROGEN (BUN) (mg/dL)   Date Value   03/04/2025 10     Urea Nitrogen (mg/dL)   Date Value   09/17/2024 12   03/06/2024 21   09/07/2023 15   02/28/2023 13   09/01/2022 13     Creatinine (mg/dL)   Date Value   09/17/2024 0.95   03/06/2024 0.93   09/07/2023 0.90   02/28/2023 0.94   09/01/2022 1.00     CREATININE (mg/dL)   Date Value   03/04/2025 0.83     Lab Results   Component Value Date    CHOL 272 (H) 03/04/2025    CHOL 181 03/06/2024    CHOL 118 02/28/2023     Lab Results   Component Value Date    HDL 43 03/04/2025    HDL 39.1 03/06/2024    HDL 40.9 02/28/2023     Lab Results   Component Value Date    LDLCALC  03/04/2025      Comment:         LDL cholesterol not calculated. Triglyceride levels  greater than 400 mg/dL invalidate calculated LDL results.           Reference range: <100     Desirable range <100 mg/dL for primary prevention;    <70 mg/dL for patients with CHD or diabetic patients   with > or = 2 CHD risk factors.     LDL-C is now calculated using the John-Michela   calculation, which is a validated novel method providing   better accuracy than the Friedewald equation in the   estimation of LDL-C.   John ONEILL et al. YARIEL. 2013;310(19): 9856-6594   (http://education.[employer].AtTask/faq/VNE096)      LDLCALC 76 03/06/2024     Lab Results   Component Value Date    TRIG 438 (H) 03/04/2025    TRIG 331 (H) 03/06/2024    TRIG 258 (H) 02/28/2023     Lab Results   Component Value Date    TSH 1.61 03/04/2025           IMPRESSION  TYPE 2 DIABETES MELLITUS  Glucose well controlled    HYPERLIPIDEMIA      RECOMMENDATIONS  Continue current program    Follow up 6 months  A1c before next appointment

## 2025-09-04 ENCOUNTER — APPOINTMENT (OUTPATIENT)
Dept: ENDOCRINOLOGY | Facility: CLINIC | Age: 55
End: 2025-09-04
Payer: MEDICARE

## 2025-09-04 ASSESSMENT — ENCOUNTER SYMPTOMS
ARTHRALGIAS: 0
APPETITE CHANGE: 0
NERVOUS/ANXIOUS: 1
ABDOMINAL PAIN: 0
DYSPHORIC MOOD: 1
SHORTNESS OF BREATH: 0
FREQUENCY: 0
CONSTIPATION: 0
VOMITING: 0
POLYDIPSIA: 0
NAUSEA: 0
SORE THROAT: 0
FEVER: 0
COUGH: 0
DIARRHEA: 0
HEADACHES: 0

## 2026-03-04 ENCOUNTER — APPOINTMENT (OUTPATIENT)
Dept: ENDOCRINOLOGY | Facility: CLINIC | Age: 56
End: 2026-03-04
Payer: MEDICARE